# Patient Record
Sex: FEMALE | Race: WHITE | Employment: PART TIME | URBAN - METROPOLITAN AREA
[De-identification: names, ages, dates, MRNs, and addresses within clinical notes are randomized per-mention and may not be internally consistent; named-entity substitution may affect disease eponyms.]

---

## 2017-01-04 ENCOUNTER — HOSPITAL ENCOUNTER (OUTPATIENT)
Dept: CT IMAGING | Age: 62
Discharge: HOME | End: 2017-01-04
Attending: NURSE PRACTITIONER | Admitting: NURSE PRACTITIONER

## 2017-01-04 DIAGNOSIS — C34.12 MALIGNANT NEOPLASM OF UPPER LOBE, LEFT BRONCHUS OR LUNG (HCC): ICD-10-CM

## 2017-01-04 DIAGNOSIS — C34.12 PRIMARY CANCER OF LEFT UPPER LOBE OF LUNG (HCC): ICD-10-CM

## 2017-01-31 RX ORDER — SIMVASTATIN 40 MG
TABLET ORAL
Qty: 30 TABLET | Refills: 0 | Status: SHIPPED | OUTPATIENT
Start: 2017-01-31 | End: 2017-02-28 | Stop reason: SDUPTHER

## 2017-02-28 RX ORDER — SIMVASTATIN 40 MG
TABLET ORAL
Qty: 30 TABLET | Refills: 0 | Status: SHIPPED | OUTPATIENT
Start: 2017-02-28 | End: 2017-03-06 | Stop reason: SDUPTHER

## 2017-03-06 ENCOUNTER — OFFICE VISIT (OUTPATIENT)
Dept: INTERNAL MEDICINE | Age: 62
End: 2017-03-06

## 2017-03-06 VITALS
OXYGEN SATURATION: 100 % | HEART RATE: 70 BPM | HEIGHT: 61 IN | DIASTOLIC BLOOD PRESSURE: 74 MMHG | SYSTOLIC BLOOD PRESSURE: 120 MMHG | WEIGHT: 147.4 LBS | BODY MASS INDEX: 27.83 KG/M2

## 2017-03-06 DIAGNOSIS — Z12.31 SCREENING MAMMOGRAM, ENCOUNTER FOR: ICD-10-CM

## 2017-03-06 DIAGNOSIS — Z13.1 ENCOUNTER FOR SCREENING FOR DIABETES MELLITUS: ICD-10-CM

## 2017-03-06 DIAGNOSIS — Z00.00 ROUTINE GENERAL MEDICAL EXAMINATION AT A HEALTH CARE FACILITY: ICD-10-CM

## 2017-03-06 DIAGNOSIS — C34.12 PRIMARY CANCER OF LEFT UPPER LOBE OF LUNG (HCC): ICD-10-CM

## 2017-03-06 DIAGNOSIS — Z13.820 SCREENING FOR OSTEOPOROSIS: ICD-10-CM

## 2017-03-06 DIAGNOSIS — Z11.59 NEED FOR HEPATITIS C SCREENING TEST: ICD-10-CM

## 2017-03-06 DIAGNOSIS — Z11.4 SCREENING FOR HIV WITHOUT PRESENCE OF RISK FACTORS: ICD-10-CM

## 2017-03-06 DIAGNOSIS — E78.5 HYPERLIPIDEMIA, UNSPECIFIED HYPERLIPIDEMIA TYPE: ICD-10-CM

## 2017-03-06 DIAGNOSIS — I10 ESSENTIAL HYPERTENSION: Primary | ICD-10-CM

## 2017-03-06 PROCEDURE — 99396 PREV VISIT EST AGE 40-64: CPT | Performed by: INTERNAL MEDICINE

## 2017-03-06 PROCEDURE — 93000 ELECTROCARDIOGRAM COMPLETE: CPT | Performed by: INTERNAL MEDICINE

## 2017-03-06 RX ORDER — SIMVASTATIN 40 MG
TABLET ORAL
Qty: 90 TABLET | Refills: 1 | Status: SHIPPED | OUTPATIENT
Start: 2017-03-06 | End: 2017-09-21 | Stop reason: SDUPTHER

## 2017-03-06 ASSESSMENT — ENCOUNTER SYMPTOMS
COUGH: 1
SORE THROAT: 0
BLOOD IN STOOL: 0
RECTAL PAIN: 0
ABDOMINAL DISTENTION: 0
SHORTNESS OF BREATH: 0
EYE DISCHARGE: 0
SINUS PRESSURE: 0
STRIDOR: 0
TROUBLE SWALLOWING: 0
NAUSEA: 0
CHEST TIGHTNESS: 0
ANAL BLEEDING: 0
EYE PAIN: 0
WHEEZING: 0
EYE REDNESS: 0
VOICE CHANGE: 0
COLOR CHANGE: 0
APNEA: 0
RHINORRHEA: 0
PHOTOPHOBIA: 0
DIARRHEA: 1
FACIAL SWELLING: 0
CHOKING: 0
CONSTIPATION: 0
BACK PAIN: 0
ABDOMINAL PAIN: 0
EYE ITCHING: 0
VOMITING: 0

## 2017-03-09 ENCOUNTER — HOSPITAL ENCOUNTER (OUTPATIENT)
Dept: GENERAL RADIOLOGY | Age: 62
Discharge: HOME | End: 2017-03-09
Attending: INTERNAL MEDICINE | Admitting: INTERNAL MEDICINE

## 2017-03-09 DIAGNOSIS — Z13.820 ENCOUNTER FOR SCREENING FOR OSTEOPOROSIS: ICD-10-CM

## 2017-03-09 DIAGNOSIS — Z13.820 SCREENING FOR OSTEOPOROSIS: ICD-10-CM

## 2017-03-16 ENCOUNTER — HOSPITAL ENCOUNTER (OUTPATIENT)
Dept: MAMMOGRAPHY | Age: 62
Discharge: HOME | End: 2017-03-16
Attending: OBSTETRICS & GYNECOLOGY | Admitting: OBSTETRICS & GYNECOLOGY

## 2017-03-16 DIAGNOSIS — Z12.31 SCREENING MAMMOGRAM, ENCOUNTER FOR: ICD-10-CM

## 2017-04-25 ENCOUNTER — HOSPITAL ENCOUNTER (OUTPATIENT)
Dept: CT IMAGING | Age: 62
Discharge: HOME | End: 2017-04-25
Attending: INTERNAL MEDICINE | Admitting: INTERNAL MEDICINE

## 2017-04-25 DIAGNOSIS — C78.7 SECONDARY LIVER CANCER (HCC): ICD-10-CM

## 2017-04-25 DIAGNOSIS — C34.12 PRIMARY CANCER OF LEFT UPPER LOBE OF LUNG (HCC): ICD-10-CM

## 2017-04-25 DIAGNOSIS — C34.12 MALIGNANT NEOPLASM OF UPPER LOBE, LEFT BRONCHUS OR LUNG (HCC): ICD-10-CM

## 2017-04-26 ENCOUNTER — TELEPHONE (OUTPATIENT)
Dept: INTERNAL MEDICINE | Age: 62
End: 2017-04-26

## 2017-04-26 ENCOUNTER — OFFICE VISIT (OUTPATIENT)
Dept: INTERNAL MEDICINE CLINIC | Age: 62
End: 2017-04-26

## 2017-04-26 ENCOUNTER — HOSPITAL ENCOUNTER (OUTPATIENT)
Dept: VASCULAR LAB | Age: 62
Discharge: HOME | End: 2017-04-26
Attending: INTERNAL MEDICINE | Admitting: INTERNAL MEDICINE

## 2017-04-26 VITALS
SYSTOLIC BLOOD PRESSURE: 120 MMHG | HEART RATE: 90 BPM | TEMPERATURE: 98.9 F | OXYGEN SATURATION: 98 % | WEIGHT: 144 LBS | DIASTOLIC BLOOD PRESSURE: 70 MMHG | BODY MASS INDEX: 27.19 KG/M2

## 2017-04-26 DIAGNOSIS — M79.661 PAIN IN RIGHT LOWER LEG: ICD-10-CM

## 2017-04-26 DIAGNOSIS — M79.89 RIGHT LEG SWELLING: Primary | ICD-10-CM

## 2017-04-26 DIAGNOSIS — I82.431 ACUTE DEEP VEIN THROMBOSIS (DVT) OF POPLITEAL VEIN OF RIGHT LOWER EXTREMITY (HCC): Primary | ICD-10-CM

## 2017-04-26 PROCEDURE — 99213 OFFICE O/P EST LOW 20 MIN: CPT | Performed by: NURSE PRACTITIONER

## 2017-04-26 ASSESSMENT — ENCOUNTER SYMPTOMS
SHORTNESS OF BREATH: 0
COUGH: 0
WHEEZING: 0
CHEST TIGHTNESS: 0

## 2017-05-11 ENCOUNTER — OFFICE VISIT (OUTPATIENT)
Dept: INTERNAL MEDICINE | Age: 62
End: 2017-05-11

## 2017-05-11 VITALS
WEIGHT: 142 LBS | OXYGEN SATURATION: 97 % | DIASTOLIC BLOOD PRESSURE: 64 MMHG | BODY MASS INDEX: 26.81 KG/M2 | HEART RATE: 76 BPM | SYSTOLIC BLOOD PRESSURE: 106 MMHG

## 2017-05-11 DIAGNOSIS — I82.4Y1 ACUTE DEEP VEIN THROMBOSIS (DVT) OF PROXIMAL VEIN OF RIGHT LOWER EXTREMITY (HCC): ICD-10-CM

## 2017-05-11 PROBLEM — I82.409 DVT (DEEP VENOUS THROMBOSIS) (HCC): Status: ACTIVE | Noted: 2017-05-11

## 2017-05-11 PROCEDURE — 99213 OFFICE O/P EST LOW 20 MIN: CPT | Performed by: INTERNAL MEDICINE

## 2017-05-11 ASSESSMENT — ENCOUNTER SYMPTOMS
CHEST TIGHTNESS: 0
RESPIRATORY NEGATIVE: 1
SHORTNESS OF BREATH: 0
GASTROINTESTINAL NEGATIVE: 1
WHEEZING: 0

## 2017-07-14 ENCOUNTER — HOSPITAL ENCOUNTER (OUTPATIENT)
Dept: CT IMAGING | Age: 62
Discharge: HOME | End: 2017-07-14
Attending: INTERNAL MEDICINE | Admitting: INTERNAL MEDICINE

## 2017-07-14 DIAGNOSIS — C34.12 PRIMARY CANCER OF LEFT UPPER LOBE OF LUNG (HCC): ICD-10-CM

## 2017-07-14 DIAGNOSIS — C78.7 SECONDARY LIVER CANCER (HCC): ICD-10-CM

## 2017-07-14 DIAGNOSIS — C34.12 MALIGNANT NEOPLASM OF UPPER LOBE, LEFT BRONCHUS OR LUNG (HCC): ICD-10-CM

## 2017-09-22 RX ORDER — SIMVASTATIN 40 MG
TABLET ORAL
Qty: 90 TABLET | Refills: 1 | Status: SHIPPED | OUTPATIENT
Start: 2017-09-22 | End: 2018-03-14 | Stop reason: SDUPTHER

## 2017-11-13 RX ORDER — RIVAROXABAN 20 MG/1
TABLET, FILM COATED ORAL
Qty: 90 TABLET | Refills: 1 | Status: SHIPPED | OUTPATIENT
Start: 2017-11-13 | End: 2019-10-04 | Stop reason: CLARIF

## 2017-11-30 ENCOUNTER — HOSPITAL ENCOUNTER (OUTPATIENT)
Dept: CT IMAGING | Age: 62
Discharge: HOME | End: 2017-11-30
Attending: INTERNAL MEDICINE | Admitting: INTERNAL MEDICINE

## 2017-11-30 DIAGNOSIS — C34.12 MALIGNANT NEOPLASM OF UPPER LOBE, LEFT BRONCHUS OR LUNG (HCC): ICD-10-CM

## 2017-11-30 DIAGNOSIS — C34.12 CANCER OF BRONCHUS OF LEFT UPPER LOBE (HCC): ICD-10-CM

## 2018-03-14 RX ORDER — SIMVASTATIN 40 MG
TABLET ORAL
Qty: 90 TABLET | Refills: 1 | Status: SHIPPED | OUTPATIENT
Start: 2018-03-14 | End: 2018-09-11 | Stop reason: SDUPTHER

## 2018-05-04 ENCOUNTER — HOSPITAL ENCOUNTER (OUTPATIENT)
Dept: MAMMOGRAPHY | Age: 63
Discharge: HOME | End: 2018-05-04
Attending: INTERNAL MEDICINE | Admitting: INTERNAL MEDICINE

## 2018-05-04 DIAGNOSIS — Z12.31 VISIT FOR SCREENING MAMMOGRAM: ICD-10-CM

## 2018-05-09 RX ORDER — RIVAROXABAN 20 MG/1
TABLET, FILM COATED ORAL
Qty: 90 TABLET | Refills: 1 | OUTPATIENT
Start: 2018-05-09

## 2018-06-15 ENCOUNTER — HOSPITAL ENCOUNTER (OUTPATIENT)
Dept: CT IMAGING | Age: 63
Discharge: HOME | End: 2018-06-15
Attending: INTERNAL MEDICINE | Admitting: INTERNAL MEDICINE

## 2018-06-15 DIAGNOSIS — C34.2 MALIGNANT NEOPLASM OF MIDDLE LOBE, BRONCHUS OR LUNG (HCC): ICD-10-CM

## 2018-06-15 DIAGNOSIS — C34.12 CANCER OF BRONCHUS OF LEFT UPPER LOBE (HCC): ICD-10-CM

## 2018-07-17 ENCOUNTER — HOSPITAL ENCOUNTER (OUTPATIENT)
Dept: NON INVASIVE DIAGNOSTICS | Age: 63
Discharge: HOME | End: 2018-07-17
Payer: COMMERCIAL

## 2018-07-17 LAB
LV EF: 63 %
LVEF MODALITY: NORMAL

## 2018-07-17 PROCEDURE — 93306 TTE W/DOPPLER COMPLETE: CPT

## 2018-09-11 RX ORDER — SIMVASTATIN 40 MG
TABLET ORAL
Qty: 90 TABLET | Refills: 1 | Status: SHIPPED | OUTPATIENT
Start: 2018-09-11 | End: 2019-03-05 | Stop reason: SDUPTHER

## 2018-10-31 ENCOUNTER — HOSPITAL ENCOUNTER (OUTPATIENT)
Dept: CT IMAGING | Age: 63
Discharge: HOME | End: 2018-10-31
Payer: COMMERCIAL

## 2018-10-31 DIAGNOSIS — C34.12 CANCER OF BRONCHUS OF LEFT UPPER LOBE (HCC): ICD-10-CM

## 2018-10-31 PROCEDURE — 71260 CT THORAX DX C+: CPT

## 2018-10-31 PROCEDURE — 74177 CT ABD & PELVIS W/CONTRAST: CPT

## 2018-10-31 PROCEDURE — 6360000004 HC RX CONTRAST MEDICATION: Performed by: INTERNAL MEDICINE

## 2018-10-31 RX ADMIN — IOPAMIDOL 80 ML: 755 INJECTION, SOLUTION INTRAVENOUS at 09:53

## 2018-10-31 RX ADMIN — IOHEXOL 50 ML: 240 INJECTION, SOLUTION INTRATHECAL; INTRAVASCULAR; INTRAVENOUS; ORAL at 09:53

## 2018-11-14 ENCOUNTER — HOSPITAL ENCOUNTER (OUTPATIENT)
Dept: CT IMAGING | Age: 63
Discharge: HOME | End: 2018-11-14
Payer: COMMERCIAL

## 2018-11-14 DIAGNOSIS — C34.12 CANCER OF BRONCHUS OF LEFT UPPER LOBE (HCC): ICD-10-CM

## 2018-11-14 PROCEDURE — 71250 CT THORAX DX C-: CPT

## 2018-11-29 ENCOUNTER — OFFICE VISIT (OUTPATIENT)
Dept: PULMONOLOGY | Age: 63
End: 2018-11-29
Payer: COMMERCIAL

## 2018-11-29 VITALS
SYSTOLIC BLOOD PRESSURE: 136 MMHG | WEIGHT: 159.8 LBS | DIASTOLIC BLOOD PRESSURE: 74 MMHG | RESPIRATION RATE: 18 BRPM | OXYGEN SATURATION: 99 % | HEIGHT: 61 IN | BODY MASS INDEX: 30.17 KG/M2 | HEART RATE: 86 BPM

## 2018-11-29 DIAGNOSIS — R59.0 MEDIASTINAL LYMPHADENOPATHY: ICD-10-CM

## 2018-11-29 DIAGNOSIS — J70.0 RADIATION PNEUMONITIS (HCC): Primary | ICD-10-CM

## 2018-11-29 DIAGNOSIS — C34.12 PRIMARY CANCER OF LEFT UPPER LOBE OF LUNG (HCC): ICD-10-CM

## 2018-11-29 PROCEDURE — 1036F TOBACCO NON-USER: CPT | Performed by: INTERNAL MEDICINE

## 2018-11-29 PROCEDURE — 99214 OFFICE O/P EST MOD 30 MIN: CPT | Performed by: INTERNAL MEDICINE

## 2018-11-29 PROCEDURE — G8484 FLU IMMUNIZE NO ADMIN: HCPCS | Performed by: INTERNAL MEDICINE

## 2018-11-29 PROCEDURE — 3017F COLORECTAL CA SCREEN DOC REV: CPT | Performed by: INTERNAL MEDICINE

## 2018-11-29 PROCEDURE — G8417 CALC BMI ABV UP PARAM F/U: HCPCS | Performed by: INTERNAL MEDICINE

## 2018-11-29 PROCEDURE — G8427 DOCREV CUR MEDS BY ELIG CLIN: HCPCS | Performed by: INTERNAL MEDICINE

## 2018-11-29 RX ORDER — PREDNISONE 10 MG/1
TABLET ORAL
Qty: 100 TABLET | Refills: 0 | Status: SHIPPED | OUTPATIENT
Start: 2018-11-29 | End: 2018-12-20 | Stop reason: SDUPTHER

## 2018-11-29 RX ORDER — LISINOPRIL 10 MG/1
10 TABLET ORAL DAILY
COMMUNITY

## 2018-11-29 NOTE — PROGRESS NOTES
Subjective:      Patient ID: Sabina Jones is a 66 y.o. female. HPI  Mrs. Deana Maria is referred by Dr. Lior Hernandez for evaluation of progressive pulmonary infiltrates. She has a history of adenocarcinoma the lung that was diagnosed 2 years ago, with the extensive mediastinal lymphadenopathy. She has been treated with chemotherapy, and in July of this year treated with radiation therapy to the left chest.  The central tumor and lymphadenopathy have been regressing on successive scans. She generally has been feeling well, does not complain of shortness of breath, except perhaps getting a little winded going up stairs. However she has not curtailed any activities. She had a cough in the summer that was productive of mucopurulent sputum. This resolved and was replaced with a nonproductive cough. She has not had any febrile illness, night sweats or chills. Denies any chest pain or tightness. She has had left vocal cord paralysis, related to the cancer and had a medialization procedure done to stabilize the left vocal cord. She has been undergoing voice therapy since then and is recovering a more normal voice. Chest CT scan in July showed enlarging left hilar mass and unchanged anterior mediastinal opacity. A follow-up CT scan on 10/31/18, post radiation therapy, showed decrease in size of both anterior mediastinal opacities and left hilar masses. On the new scan, however there was development of alveolar opacities with a peribronchial distribution, centrally. A follow-up scan on 11/14/18 showed worsening alveolar infiltrates, with the same distribution. She is a lifelong nonsmoker. She has a history of allergies particularly to cats and feathers. She does not have seasonal allergies. She has no history of asthma associated with this. She has a history of melanoma occurring on her upper back, status post excision in 2013.     Review of Systems    Objective:   Physical Exam   Constitutional:

## 2018-12-03 ENCOUNTER — TELEPHONE (OUTPATIENT)
Dept: PULMONOLOGY | Age: 63
End: 2018-12-03

## 2018-12-19 ENCOUNTER — HOSPITAL ENCOUNTER (OUTPATIENT)
Dept: CT IMAGING | Age: 63
Discharge: HOME | End: 2018-12-19
Payer: COMMERCIAL

## 2018-12-19 DIAGNOSIS — J70.0 RADIATION PNEUMONITIS (HCC): ICD-10-CM

## 2018-12-19 PROCEDURE — 71250 CT THORAX DX C-: CPT

## 2018-12-20 ENCOUNTER — OFFICE VISIT (OUTPATIENT)
Dept: PULMONOLOGY | Age: 63
End: 2018-12-20
Payer: COMMERCIAL

## 2018-12-20 VITALS
RESPIRATION RATE: 18 BRPM | HEIGHT: 61 IN | WEIGHT: 161.8 LBS | DIASTOLIC BLOOD PRESSURE: 80 MMHG | BODY MASS INDEX: 30.55 KG/M2 | SYSTOLIC BLOOD PRESSURE: 136 MMHG | HEART RATE: 96 BPM | OXYGEN SATURATION: 98 %

## 2018-12-20 DIAGNOSIS — J70.0 RADIATION PNEUMONITIS (HCC): Primary | ICD-10-CM

## 2018-12-20 PROCEDURE — 1036F TOBACCO NON-USER: CPT | Performed by: INTERNAL MEDICINE

## 2018-12-20 PROCEDURE — 99213 OFFICE O/P EST LOW 20 MIN: CPT | Performed by: INTERNAL MEDICINE

## 2018-12-20 PROCEDURE — 3017F COLORECTAL CA SCREEN DOC REV: CPT | Performed by: INTERNAL MEDICINE

## 2018-12-20 PROCEDURE — G8417 CALC BMI ABV UP PARAM F/U: HCPCS | Performed by: INTERNAL MEDICINE

## 2018-12-20 PROCEDURE — G8484 FLU IMMUNIZE NO ADMIN: HCPCS | Performed by: INTERNAL MEDICINE

## 2018-12-20 PROCEDURE — G8427 DOCREV CUR MEDS BY ELIG CLIN: HCPCS | Performed by: INTERNAL MEDICINE

## 2018-12-20 RX ORDER — PREDNISONE 10 MG/1
TABLET ORAL
Qty: 100 TABLET | Refills: 1 | Status: SHIPPED | OUTPATIENT
Start: 2018-12-20 | End: 2018-12-26 | Stop reason: SDUPTHER

## 2018-12-20 NOTE — PROGRESS NOTES
Subjective:      Patient ID: Td Aiken is a 66 y.o. female. HPI  Mrs. Robert Miranda returns after 3 weeks of prednisone 30 mg daily for treatment of radiation pneumonitis involving the left lung. She reports that her cough has become or productive of viscous mucoid sputum, but she is not necessarily coughing more frequently. She does not have shortness of breath, although limits her activity somewhat. No febrile illness. No GI complaints. Appetite is good. Chest CT scan on 12/19/18 is reviewed, compared to the chest CT from 11/14/18. The perihilar alveolar opacities have decreased somewhat compared to November study. The overall process continues, but there is less density. .  This is in contrast to the radiologist interpretation of no significant change from prior exam..  There are no new findings. Review of Systems    Objective:   Physical Exam   Constitutional: She is oriented to person, place, and time. She appears well-developed and well-nourished. HENT:   Head: Normocephalic and atraumatic. Mouth/Throat: Oropharynx is clear and moist. No oropharyngeal exudate. Eyes: Right eye exhibits no discharge. Left eye exhibits no discharge. No scleral icterus. Neck: No tracheal deviation present. No thyromegaly present. Cardiovascular: Normal rate, regular rhythm, normal heart sounds and intact distal pulses. No murmur heard. Pulmonary/Chest: Effort normal and breath sounds normal. No respiratory distress. She has no wheezes. She has no rales. Musculoskeletal: She exhibits no edema. Lymphadenopathy:     She has no cervical adenopathy. Neurological: She is alert and oriented to person, place, and time. Skin: Skin is warm and dry. Psychiatric: She has a normal mood and affect. Her behavior is normal. Judgment and thought content normal.   Vitals reviewed. Assessment:      Radiation pneumonitis, involving the left lung.   Clinically after 3 weeks she has increased secretion

## 2018-12-26 RX ORDER — PREDNISONE 10 MG/1
TABLET ORAL
Qty: 100 TABLET | Refills: 0 | Status: ON HOLD | OUTPATIENT
Start: 2018-12-26 | End: 2019-03-15 | Stop reason: ALTCHOICE

## 2018-12-26 NOTE — TELEPHONE ENCOUNTER
Dr. Gale Granados PT. Refill request for pending medication- prednisone. I copied Dr. Gale Granados last note regarding prednisone. See below, OV notes 12/20/18. Plan:   Continue prednisone 30 mg daily for another 3 weeks, reevaluate clinically and consider tapering dose at that point. Plan follow-up chest CT at the end of prednisone therapy. Advised a habit of regular exercise to improve overall fitness and health.

## 2019-01-14 ENCOUNTER — OFFICE VISIT (OUTPATIENT)
Dept: PULMONOLOGY | Age: 64
End: 2019-01-14
Payer: COMMERCIAL

## 2019-01-14 VITALS
HEART RATE: 100 BPM | HEIGHT: 61 IN | RESPIRATION RATE: 18 BRPM | BODY MASS INDEX: 31.53 KG/M2 | SYSTOLIC BLOOD PRESSURE: 132 MMHG | WEIGHT: 167 LBS | OXYGEN SATURATION: 99 % | DIASTOLIC BLOOD PRESSURE: 80 MMHG

## 2019-01-14 DIAGNOSIS — C34.12 PRIMARY CANCER OF LEFT UPPER LOBE OF LUNG (HCC): ICD-10-CM

## 2019-01-14 DIAGNOSIS — J70.0 RADIATION PNEUMONITIS (HCC): Primary | ICD-10-CM

## 2019-01-14 PROCEDURE — G8417 CALC BMI ABV UP PARAM F/U: HCPCS | Performed by: INTERNAL MEDICINE

## 2019-01-14 PROCEDURE — 94618 PULMONARY STRESS TESTING: CPT | Performed by: INTERNAL MEDICINE

## 2019-01-14 PROCEDURE — 1036F TOBACCO NON-USER: CPT | Performed by: INTERNAL MEDICINE

## 2019-01-14 PROCEDURE — 3017F COLORECTAL CA SCREEN DOC REV: CPT | Performed by: INTERNAL MEDICINE

## 2019-01-14 PROCEDURE — G8484 FLU IMMUNIZE NO ADMIN: HCPCS | Performed by: INTERNAL MEDICINE

## 2019-01-14 PROCEDURE — 99214 OFFICE O/P EST MOD 30 MIN: CPT | Performed by: INTERNAL MEDICINE

## 2019-01-14 PROCEDURE — G8427 DOCREV CUR MEDS BY ELIG CLIN: HCPCS | Performed by: INTERNAL MEDICINE

## 2019-01-21 ENCOUNTER — TELEPHONE (OUTPATIENT)
Dept: PULMONOLOGY | Age: 64
End: 2019-01-21

## 2019-01-21 DIAGNOSIS — J70.0 RADIATION PNEUMONITIS (HCC): Primary | ICD-10-CM

## 2019-01-28 ENCOUNTER — HOSPITAL ENCOUNTER (OUTPATIENT)
Dept: GENERAL RADIOLOGY | Age: 64
Discharge: HOME | End: 2019-01-28
Payer: COMMERCIAL

## 2019-01-28 ENCOUNTER — HOSPITAL ENCOUNTER (OUTPATIENT)
Age: 64
Discharge: HOME | End: 2019-01-28
Payer: COMMERCIAL

## 2019-01-28 DIAGNOSIS — J70.0: ICD-10-CM

## 2019-01-28 PROCEDURE — 71046 X-RAY EXAM CHEST 2 VIEWS: CPT

## 2019-01-31 ENCOUNTER — TELEPHONE (OUTPATIENT)
Dept: PULMONOLOGY | Age: 64
End: 2019-01-31

## 2019-02-14 ENCOUNTER — OFFICE VISIT (OUTPATIENT)
Dept: PULMONOLOGY | Age: 64
End: 2019-02-14
Payer: COMMERCIAL

## 2019-02-14 VITALS
RESPIRATION RATE: 18 BRPM | SYSTOLIC BLOOD PRESSURE: 130 MMHG | BODY MASS INDEX: 31.15 KG/M2 | WEIGHT: 165 LBS | OXYGEN SATURATION: 98 % | HEART RATE: 109 BPM | DIASTOLIC BLOOD PRESSURE: 72 MMHG | HEIGHT: 61 IN

## 2019-02-14 DIAGNOSIS — R05.9 COUGH: ICD-10-CM

## 2019-02-14 DIAGNOSIS — J70.0 RADIATION PNEUMONITIS (HCC): Primary | ICD-10-CM

## 2019-02-14 DIAGNOSIS — C34.12 PRIMARY CANCER OF LEFT UPPER LOBE OF LUNG (HCC): ICD-10-CM

## 2019-02-14 PROCEDURE — G8427 DOCREV CUR MEDS BY ELIG CLIN: HCPCS | Performed by: INTERNAL MEDICINE

## 2019-02-14 PROCEDURE — G8417 CALC BMI ABV UP PARAM F/U: HCPCS | Performed by: INTERNAL MEDICINE

## 2019-02-14 PROCEDURE — G8484 FLU IMMUNIZE NO ADMIN: HCPCS | Performed by: INTERNAL MEDICINE

## 2019-02-14 PROCEDURE — 1036F TOBACCO NON-USER: CPT | Performed by: INTERNAL MEDICINE

## 2019-02-14 PROCEDURE — 99213 OFFICE O/P EST LOW 20 MIN: CPT | Performed by: INTERNAL MEDICINE

## 2019-02-14 PROCEDURE — 3017F COLORECTAL CA SCREEN DOC REV: CPT | Performed by: INTERNAL MEDICINE

## 2019-03-04 ENCOUNTER — HOSPITAL ENCOUNTER (OUTPATIENT)
Dept: CT IMAGING | Age: 64
Discharge: HOME | End: 2019-03-04
Payer: COMMERCIAL

## 2019-03-04 DIAGNOSIS — C34.12 CANCER OF BRONCHUS OF LEFT UPPER LOBE (HCC): ICD-10-CM

## 2019-03-04 LAB
GFR AFRICAN AMERICAN: >60
GFR NON-AFRICAN AMERICAN: >60
PERFORMED ON: NORMAL
POC CREATININE: 0.7 MG/DL (ref 0.6–1.2)
POC SAMPLE TYPE: NORMAL

## 2019-03-04 PROCEDURE — 6360000004 HC RX CONTRAST MEDICATION: Performed by: INTERNAL MEDICINE

## 2019-03-04 PROCEDURE — 70491 CT SOFT TISSUE NECK W/DYE: CPT

## 2019-03-04 PROCEDURE — 82565 ASSAY OF CREATININE: CPT

## 2019-03-04 PROCEDURE — 71260 CT THORAX DX C+: CPT

## 2019-03-04 RX ADMIN — IOHEXOL 50 ML: 240 INJECTION, SOLUTION INTRATHECAL; INTRAVASCULAR; INTRAVENOUS; ORAL at 11:05

## 2019-03-04 RX ADMIN — IOPAMIDOL 100 ML: 755 INJECTION, SOLUTION INTRAVENOUS at 11:04

## 2019-03-05 RX ORDER — SIMVASTATIN 40 MG
TABLET ORAL
Qty: 90 TABLET | Refills: 0 | Status: SHIPPED | OUTPATIENT
Start: 2019-03-05

## 2019-03-15 ENCOUNTER — HOSPITAL ENCOUNTER (OUTPATIENT)
Dept: INTERVENTIONAL RADIOLOGY/VASCULAR | Age: 64
Discharge: HOME | End: 2019-03-15
Attending: RADIOLOGY | Admitting: RADIOLOGY
Payer: COMMERCIAL

## 2019-03-15 VITALS — BODY MASS INDEX: 29.44 KG/M2 | WEIGHT: 160 LBS | TEMPERATURE: 98.4 F | HEIGHT: 62 IN

## 2019-03-15 PROCEDURE — 6360000004 HC RX CONTRAST MEDICATION: Performed by: RADIOLOGY

## 2019-03-15 PROCEDURE — C1725 CATH, TRANSLUMIN NON-LASER: HCPCS

## 2019-03-15 PROCEDURE — 36010 PLACE CATHETER IN VEIN: CPT

## 2019-03-15 PROCEDURE — 75827 VEIN X-RAY CHEST: CPT

## 2019-03-15 PROCEDURE — 99152 MOD SED SAME PHYS/QHP 5/>YRS: CPT

## 2019-03-15 PROCEDURE — C1769 GUIDE WIRE: HCPCS

## 2019-03-15 PROCEDURE — C1887 CATHETER, GUIDING: HCPCS

## 2019-03-15 PROCEDURE — 2709999900 HC NON-CHARGEABLE SUPPLY

## 2019-03-15 PROCEDURE — C1894 INTRO/SHEATH, NON-LASER: HCPCS

## 2019-03-15 PROCEDURE — 99153 MOD SED SAME PHYS/QHP EA: CPT

## 2019-03-15 PROCEDURE — 37248 TRLUML BALO ANGIOP 1ST VEIN: CPT

## 2019-03-15 PROCEDURE — 2500000003 HC RX 250 WO HCPCS

## 2019-03-15 PROCEDURE — 6360000002 HC RX W HCPCS

## 2019-03-15 RX ADMIN — IOPAMIDOL 100 ML: 755 INJECTION, SOLUTION INTRAVENOUS at 16:28

## 2019-03-16 PROBLEM — R05.9 COUGH: Status: RESOLVED | Noted: 2019-02-14 | Resolved: 2019-03-16

## 2019-03-29 ENCOUNTER — PRE-PROCEDURE TELEPHONE (OUTPATIENT)
Dept: CARDIAC CATH/INVASIVE PROCEDURES | Age: 64
End: 2019-03-29

## 2019-04-01 ENCOUNTER — HOSPITAL ENCOUNTER (OUTPATIENT)
Dept: INTERVENTIONAL RADIOLOGY/VASCULAR | Age: 64
Discharge: HOME | End: 2019-04-01
Attending: RADIOLOGY | Admitting: RADIOLOGY
Payer: COMMERCIAL

## 2019-04-01 VITALS — BODY MASS INDEX: 27.6 KG/M2 | TEMPERATURE: 98.8 F | HEIGHT: 62 IN | WEIGHT: 150 LBS

## 2019-04-01 LAB
ANION GAP SERPL CALCULATED.3IONS-SCNC: 17 MMOL/L (ref 3–16)
BUN BLDV-MCNC: 12 MG/DL (ref 7–20)
CALCIUM SERPL-MCNC: 10.1 MG/DL (ref 8.3–10.6)
CHLORIDE BLD-SCNC: 97 MMOL/L (ref 99–110)
CO2: 25 MMOL/L (ref 21–32)
CREAT SERPL-MCNC: 0.7 MG/DL (ref 0.6–1.2)
GFR AFRICAN AMERICAN: >60
GFR NON-AFRICAN AMERICAN: >60
GLUCOSE BLD-MCNC: 156 MG/DL (ref 70–99)
HCT VFR BLD CALC: 33.2 % (ref 36–48)
HEMOGLOBIN: 10.6 G/DL (ref 12–16)
INR BLD: 1.11 (ref 0.86–1.14)
MCH RBC QN AUTO: 28.1 PG (ref 26–34)
MCHC RBC AUTO-ENTMCNC: 32 G/DL (ref 31–36)
MCV RBC AUTO: 87.8 FL (ref 80–100)
PDW BLD-RTO: 16 % (ref 12.4–15.4)
PLATELET # BLD: 325 K/UL (ref 135–450)
PMV BLD AUTO: 7.2 FL (ref 5–10.5)
POTASSIUM SERPL-SCNC: 3.8 MMOL/L (ref 3.5–5.1)
PROTHROMBIN TIME: 12.6 SEC (ref 9.8–13)
RBC # BLD: 3.78 M/UL (ref 4–5.2)
SODIUM BLD-SCNC: 139 MMOL/L (ref 136–145)
WBC # BLD: 9 K/UL (ref 4–11)

## 2019-04-01 PROCEDURE — 36010 PLACE CATHETER IN VEIN: CPT

## 2019-04-01 PROCEDURE — 80048 BASIC METABOLIC PNL TOTAL CA: CPT

## 2019-04-01 PROCEDURE — C1894 INTRO/SHEATH, NON-LASER: HCPCS

## 2019-04-01 PROCEDURE — 2709999900 HC NON-CHARGEABLE SUPPLY

## 2019-04-01 PROCEDURE — 6360000004 HC RX CONTRAST MEDICATION: Performed by: RADIOLOGY

## 2019-04-01 PROCEDURE — 37248 TRLUML BALO ANGIOP 1ST VEIN: CPT

## 2019-04-01 PROCEDURE — C1725 CATH, TRANSLUMIN NON-LASER: HCPCS

## 2019-04-01 PROCEDURE — 99153 MOD SED SAME PHYS/QHP EA: CPT

## 2019-04-01 PROCEDURE — 2500000003 HC RX 250 WO HCPCS

## 2019-04-01 PROCEDURE — C1769 GUIDE WIRE: HCPCS

## 2019-04-01 PROCEDURE — 99152 MOD SED SAME PHYS/QHP 5/>YRS: CPT

## 2019-04-01 PROCEDURE — 85610 PROTHROMBIN TIME: CPT

## 2019-04-01 PROCEDURE — 75827 VEIN X-RAY CHEST: CPT

## 2019-04-01 PROCEDURE — 6360000002 HC RX W HCPCS

## 2019-04-01 PROCEDURE — 6370000000 HC RX 637 (ALT 250 FOR IP): Performed by: RADIOLOGY

## 2019-04-01 PROCEDURE — 85027 COMPLETE CBC AUTOMATED: CPT

## 2019-04-01 RX ORDER — ACETAMINOPHEN 325 MG/1
650 TABLET ORAL EVERY 4 HOURS PRN
Status: DISCONTINUED | OUTPATIENT
Start: 2019-04-01 | End: 2019-04-02 | Stop reason: HOSPADM

## 2019-04-01 RX ORDER — IODIXANOL 320 MG/ML
100 INJECTION, SOLUTION INTRAVASCULAR
Status: COMPLETED | OUTPATIENT
Start: 2019-04-01 | End: 2019-04-01

## 2019-04-01 RX ORDER — ONDANSETRON 2 MG/ML
4 INJECTION INTRAMUSCULAR; INTRAVENOUS EVERY 8 HOURS PRN
Status: DISCONTINUED | OUTPATIENT
Start: 2019-04-01 | End: 2019-04-02 | Stop reason: HOSPADM

## 2019-04-01 RX ADMIN — IODIXANOL 100 ML: 320 INJECTION, SOLUTION INTRAVASCULAR at 12:23

## 2019-04-01 RX ADMIN — IOHEXOL 50 ML: 350 INJECTION, SOLUTION INTRAVENOUS at 12:12

## 2019-04-01 RX ADMIN — RIVAROXABAN 20 MG: 20 TABLET, FILM COATED ORAL at 13:52

## 2019-04-01 ASSESSMENT — PAIN - FUNCTIONAL ASSESSMENT: PAIN_FUNCTIONAL_ASSESSMENT: 0-10

## 2019-04-01 NOTE — PLAN OF CARE
pulled more proximal during the procedure. Fluoroscopy time: 5.4 minute  Number of images: 67  Ultrasound images: 1  Consultations: None  Estimated blood loss: Less than 1 cc    IMPRESSION:  1. Balloon dilatation superior vena cava as described. Assessment as to relief of the SVC symptoms 1 the patient awakens in the morning will be reassessed. Because of the size of the upper superior vena cava measuring 20 mm, a stent 22 mm would be required if this remains symptomatic. Prior to placement of the stent, removal of the portacatheter is suggested centimeters of removing individual variables that can be associated with the patient's symptoms.     The patient was restarted on Xarelto 2 hours following the procedure

## 2019-04-01 NOTE — H&P
IR  H & P      Patient:  Juan Antonio Connelly   :   1955      Relevant patient history reviewed and discussed. CC: recurrent SVC symptoms    The procedure including risks and benefits was discussed at length with the patient (or designated family member) and all questions were answered. Informed consent to proceed with the procedure was given. Heart : regular rate and rhythm  Lungs : clear, breathing easily  Airway Assessment: Mallampati 3  Condition : stable    Nicci Scale: Activity:  2 - Able to move 4 extremities voluntarily on command  Respiration:  2 - Able to breathe deeply and cough freely  Circulation:  2 - BP+/- 20mmHg of normal  Consciousness:  2 - Fully awake  Oxygen Saturation (color):  2 - Able to maintain oxygen saturation >92% on room air      [facility] nurses notes reviewed and agreed. Medications reviewed. No current facility-administered medications on file prior to encounter. Current Outpatient Medications on File Prior to Encounter   Medication Sig Dispense Refill    simvastatin (ZOCOR) 40 MG tablet TAKE 1 TABLET BY MOUTH EVERY EVENING 90 tablet 0    Brigatinib (ALUNBRIG) 180 MG TABS Take 180 tablets by mouth      lisinopril (PRINIVIL;ZESTRIL) 10 MG tablet Take 10 mg by mouth daily      Ascorbic Acid (VITAMIN C) 500 MG tablet Take 500 mg by mouth daily      Vitamin D (CHOLECALCIFEROL) 1000 UNITS CAPS capsule Take 1,000 Units by mouth daily.  Multiple Vitamin (MULTIVITAMINS PO) Take 1 tablet by mouth daily.  XARELTO 20 MG TABS tablet TAKE 1 TABLET BY MOUTH DAILY WITH BREAKFAST 90 tablet 1    calcium carbonate (OSCAL) 500 MG TABS tablet Take 500 mg by mouth daily       fish oil-omega-3 fatty acids 1000 MG capsule Take 1 g by mouth daily. Allergies:    Allergies   Allergen Reactions    Penicillins Rash    Other      Cats and  Feathers- cause sneezing and itching eyes     Sedation : Moderate sedation planned  ASA 2 - Patient with mild systemic disease with no functional limitations     cleared

## 2019-04-04 ENCOUNTER — HOSPITAL ENCOUNTER (OUTPATIENT)
Dept: NON INVASIVE DIAGNOSTICS | Age: 64
Discharge: HOME | End: 2019-04-04
Payer: COMMERCIAL

## 2019-04-04 LAB
LV EF: 65 %
LVEF MODALITY: NORMAL

## 2019-04-04 PROCEDURE — 93306 TTE W/DOPPLER COMPLETE: CPT

## 2019-04-08 ENCOUNTER — HOSPITAL ENCOUNTER (OUTPATIENT)
Dept: INTERVENTIONAL RADIOLOGY/VASCULAR | Age: 64
Discharge: HOME | End: 2019-04-08
Attending: RADIOLOGY | Admitting: RADIOLOGY
Payer: COMMERCIAL

## 2019-04-08 VITALS — HEIGHT: 62 IN | WEIGHT: 150 LBS | BODY MASS INDEX: 27.6 KG/M2

## 2019-04-08 PROCEDURE — C1725 CATH, TRANSLUMIN NON-LASER: HCPCS

## 2019-04-08 PROCEDURE — 6360000004 HC RX CONTRAST MEDICATION: Performed by: RADIOLOGY

## 2019-04-08 PROCEDURE — 75822 VEIN X-RAY ARMS/LEGS: CPT | Performed by: RADIOLOGY

## 2019-04-08 PROCEDURE — 6370000000 HC RX 637 (ALT 250 FOR IP): Performed by: RADIOLOGY

## 2019-04-08 PROCEDURE — C1769 GUIDE WIRE: HCPCS

## 2019-04-08 PROCEDURE — C1894 INTRO/SHEATH, NON-LASER: HCPCS

## 2019-04-08 PROCEDURE — 37249 TRLUML BALO ANGIOP ADDL VEIN: CPT | Performed by: RADIOLOGY

## 2019-04-08 PROCEDURE — 37248 TRLUML BALO ANGIOP 1ST VEIN: CPT | Performed by: RADIOLOGY

## 2019-04-08 PROCEDURE — 99153 MOD SED SAME PHYS/QHP EA: CPT | Performed by: RADIOLOGY

## 2019-04-08 PROCEDURE — 6360000002 HC RX W HCPCS

## 2019-04-08 PROCEDURE — 99152 MOD SED SAME PHYS/QHP 5/>YRS: CPT | Performed by: RADIOLOGY

## 2019-04-08 PROCEDURE — 2500000003 HC RX 250 WO HCPCS

## 2019-04-08 PROCEDURE — 36005 INJECTION EXT VENOGRAPHY: CPT | Performed by: RADIOLOGY

## 2019-04-08 PROCEDURE — C1887 CATHETER, GUIDING: HCPCS

## 2019-04-08 RX ORDER — IODIXANOL 320 MG/ML
100 INJECTION, SOLUTION INTRAVASCULAR
Status: COMPLETED | OUTPATIENT
Start: 2019-04-08 | End: 2019-04-08

## 2019-04-08 RX ADMIN — IODIXANOL 100 ML: 320 INJECTION, SOLUTION INTRAVASCULAR at 12:46

## 2019-04-08 RX ADMIN — RIVAROXABAN 20 MG: 20 TABLET, FILM COATED ORAL at 13:06

## 2019-04-08 ASSESSMENT — PAIN SCALES - GENERAL: PAINLEVEL_OUTOF10: 0

## 2019-04-08 NOTE — H&P
Patient:  Hakan Taylor   :   1955      Relevant clinical history, particularly as it involves the pending procedure, was reviewed and discussed. The procedure including risks and benefits was discussed at length with the patient (or designated family member) and all questions were answered. Informed consent to proceed with the procedure was given. Vital signs were monitored and documented by the Radiology nurse. Targeted physical examination  Heart : regular rate and rhythm  Lungs : clear, breathing easily  Condition : stable    [facility] nurses notes reviewed and agreed. Past Medical History:        Diagnosis Date    Allergic     Hyperlipidemia     Lung cancer ([address]     [facility] of back [facility] [address]     Skin cancer     melanoma L shoulder        Past Surgical History:           Procedure Laterality Date    BRONCHOSCOPY  2016    BUNIONECTOMY Right     COLONOSCOPY  09    DILATION AND CURETTAGE OF UTERUS      OTHER SURGICAL HISTORY Left 2016    SINGLE LUMEN LEFT SUBCLAVIAN PORT A CATH PLACEMENT - LEAVE ACCESSED    VOCAL CORD AUGMENTATION W/RADIESSE INJ  2018       Allergies:  Penicillins and Other    Medications:   Home Meds  No current facility-administered medications on file prior to encounter. Current Outpatient Medications on File Prior to Encounter   Medication Sig Dispense Refill    simvastatin (ZOCOR) 40 MG tablet TAKE 1 TABLET BY MOUTH EVERY EVENING 90 tablet 0    Brigatinib (ALUNBRIG) 180 MG TABS Take 180 tablets by mouth      lisinopril (PRINIVIL;ZESTRIL) 10 MG tablet Take 10 mg by mouth daily      XARELTO 20 MG TABS tablet TAKE 1 TABLET BY MOUTH DAILY WITH BREAKFAST 90 tablet 1    Ascorbic Acid (VITAMIN C) 500 MG tablet Take 500 mg by mouth daily      Vitamin D (CHOLECALCIFEROL) 1000 UNITS CAPS capsule Take 1,000 Units by mouth daily.  Multiple Vitamin (MULTIVITAMINS PO) Take 1 tablet by mouth daily.       calcium carbonate (OSCAL) 500 MG TABS tablet Take 500 mg by mouth daily       fish oil-omega-3 fatty acids 1000 MG capsule Take 1 g by mouth daily.          Current Meds    vancomycin (VANCOCIN) 1,000 mg in dextrose 5 % 250 mL IVPB Once         ASA 2 - Patient with mild systemic disease with no functional limitations    II (soft palate, uvula, fauces visible)    Activity:  2 - Able to move 4 extremities voluntarily on command  Respiration:  2 - Able to breathe deeply and cough freely  Circulation:  2 - BP+/- 20mmHg of normal  Consciousness:  2 - Fully awake  Oxygen Saturation (color):  2 - Able to maintain oxygen saturation >92% on room air    Sedation : Moderate sedation planned

## 2019-04-08 NOTE — PROCEDURES
IR Brief Postoperative Note    Cole Crocker  YOB: 1955  [id number]    Pre-operative Diagnosis: central venous obstruction    Post-operative Diagnosis: Same    Procedure: bilat upper extremity venogram with angioplasty    Anesthesia: moderate    Surgeons/Assistants: selma    Estimated Blood Loss: Minimal    Complications: none    Specimens: were not obtained    See full procedure dictation to follow      Miroslava Gregory MD  4/8/2019

## 2019-05-22 ENCOUNTER — HOSPITAL ENCOUNTER (OUTPATIENT)
Dept: VASCULAR LAB | Age: 64
Discharge: HOME | End: 2019-05-22
Payer: COMMERCIAL

## 2019-05-22 PROCEDURE — 93971 EXTREMITY STUDY: CPT

## 2019-05-30 ENCOUNTER — HOSPITAL ENCOUNTER (OUTPATIENT)
Dept: ONCOLOGY | Age: 64
Setting detail: INFUSION SERIES
Discharge: HOME | End: 2019-05-30
Payer: COMMERCIAL

## 2019-05-30 NOTE — PROGRESS NOTES
Joint Aspiration Procedure Note  Indications: left lower extremity redness and swelling +bacteremia    Performed by: Varinder Arambula DPM    Consent obtained? Yes and verbal consent given by patient and her  after being informed of the risks vs benefits of the planned procedure    Time out taken: Yes    Pain Control:   3 ml of 2% lidocaine plain were injected subcutaneously    Using chloroprep the anterior lateral aspect to the left ankle and subtalar joint were prepped. Utilizing sterile technique and an 18 gauge needle with syringe the subtalar joint was accessed. There was no fluid noted with aspiration. The needle was then rotated and repositioned and again no fluid was aspirated. The needle was withdrawn and hemostasis was achieved with gentle compression. A second prep was then performed and utilizing sterile technique and another 18 gauge needle with syringe the ankle joint was accessed. There was no fluid noted with aspiration. The needle was then rotated and repositioned and again no fluid was aspirated. The needle was withdrawn and hemostasis was achieved with gentle compression. Bleeding: None    Hemostasis Achieved: by pressure    Procedural Pain: 5  / 10     Post Procedural Pain: 5 / 10     Response to treatment:  Well tolerated by patient. Discussed with patient and her  that as there was no fluid aspirated I am unable to order fluid analysis of the joint for bacteria or crystals. However as the joints were relatively dry a septic joint or gout is unlikely as both of these conditions usually cause significant joint effusions. As the erythema has improved with IV vancomycin will continue to follow closely. If the erythema does not resolve will consider another tap. Will follow closely. Please call with any changes or worsening symptoms.      Gagan Sam  [phone]

## 2019-06-06 ENCOUNTER — HOSPITAL ENCOUNTER (OUTPATIENT)
Dept: CT IMAGING | Age: 64
Discharge: HOME | End: 2019-06-06
Payer: COMMERCIAL

## 2019-06-06 DIAGNOSIS — C78.7 SECONDARY LIVER CANCER (HCC): ICD-10-CM

## 2019-06-06 DIAGNOSIS — C34.12 PRIMARY CANCER OF LEFT UPPER LOBE OF LUNG (HCC): ICD-10-CM

## 2019-06-06 LAB
GFR AFRICAN AMERICAN: >60
GFR NON-AFRICAN AMERICAN: >60
PERFORMED ON: ABNORMAL
POC CREATININE: 0.5 MG/DL (ref 0.6–1.2)
POC SAMPLE TYPE: ABNORMAL

## 2019-06-06 PROCEDURE — 74177 CT ABD & PELVIS W/CONTRAST: CPT

## 2019-06-06 PROCEDURE — 6360000004 HC RX CONTRAST MEDICATION: Performed by: INTERNAL MEDICINE

## 2019-06-06 PROCEDURE — 82565 ASSAY OF CREATININE: CPT

## 2019-06-06 RX ADMIN — IOPAMIDOL 80 ML: 755 INJECTION, SOLUTION INTRAVENOUS at 11:46

## 2019-06-06 RX ADMIN — IOHEXOL 50 ML: 240 INJECTION, SOLUTION INTRATHECAL; INTRAVASCULAR; INTRAVENOUS; ORAL at 11:46

## 2019-09-12 ENCOUNTER — HOSPITAL ENCOUNTER (OUTPATIENT)
Dept: CT IMAGING | Age: 64
Discharge: HOME | End: 2019-09-12
Payer: COMMERCIAL

## 2019-09-12 DIAGNOSIS — C34.12 PRIMARY CANCER OF LEFT UPPER LOBE OF LUNG (HCC): ICD-10-CM

## 2019-09-12 LAB
GFR AFRICAN AMERICAN: >60
GFR NON-AFRICAN AMERICAN: >60
PERFORMED ON: NORMAL
POC CREATININE: 0.8 MG/DL (ref 0.6–1.2)
POC SAMPLE TYPE: NORMAL

## 2019-09-12 PROCEDURE — 6360000004 HC RX CONTRAST MEDICATION: Performed by: INTERNAL MEDICINE

## 2019-09-12 PROCEDURE — 74177 CT ABD & PELVIS W/CONTRAST: CPT

## 2019-09-12 PROCEDURE — 82565 ASSAY OF CREATININE: CPT

## 2019-09-12 RX ADMIN — IOHEXOL 50 ML: 240 INJECTION, SOLUTION INTRATHECAL; INTRAVASCULAR; INTRAVENOUS; ORAL at 11:19

## 2019-09-12 RX ADMIN — IOPAMIDOL 80 ML: 755 INJECTION, SOLUTION INTRAVENOUS at 11:19

## 2019-10-04 ENCOUNTER — HOSPITAL ENCOUNTER (OUTPATIENT)
Dept: ONCOLOGY | Age: 64
Setting detail: INFUSION SERIES
Discharge: HOME | DRG: 206 | End: 2019-10-04
Payer: COMMERCIAL

## 2019-10-04 ENCOUNTER — HOSPITAL ENCOUNTER (INPATIENT)
Age: 64
LOS: 3 days | Discharge: HOME | DRG: 206 | End: 2019-10-07
Attending: EMERGENCY MEDICINE | Admitting: STUDENT IN AN ORGANIZED HEALTH CARE EDUCATION/TRAINING PROGRAM
Payer: COMMERCIAL

## 2019-10-04 ENCOUNTER — APPOINTMENT (OUTPATIENT)
Dept: GENERAL RADIOLOGY | Age: 64
DRG: 206 | End: 2019-10-04
Payer: COMMERCIAL

## 2019-10-04 ENCOUNTER — HOSPITAL ENCOUNTER (OUTPATIENT)
Dept: CT IMAGING | Age: 64
Discharge: HOME | DRG: 206 | End: 2019-10-04
Payer: COMMERCIAL

## 2019-10-04 DIAGNOSIS — J98.11 COLLAPSE OF LEFT LUNG: Primary | ICD-10-CM

## 2019-10-04 DIAGNOSIS — C34.12 PRIMARY MALIGNANT NEOPLASM OF LEFT UPPER LOBE OF LUNG (HCC): ICD-10-CM

## 2019-10-04 LAB
ANION GAP SERPL CALCULATED.3IONS-SCNC: 14 MMOL/L (ref 3–16)
ANISOCYTOSIS: ABNORMAL
ATYPICAL LYMPHOCYTE RELATIVE PERCENT: 3 % (ref 0–6)
BANDED NEUTROPHILS RELATIVE PERCENT: 4 % (ref 0–7)
BASE EXCESS VENOUS: -1 (ref -3–3)
BASOPHILS ABSOLUTE: 0 K/UL (ref 0–0.2)
BASOPHILS RELATIVE PERCENT: 0 %
BUN BLDV-MCNC: 17 MG/DL (ref 7–20)
CALCIUM SERPL-MCNC: 9.7 MG/DL (ref 8.3–10.6)
CHLORIDE BLD-SCNC: 93 MMOL/L (ref 99–110)
CO2: 23 MMOL/L (ref 21–32)
CREAT SERPL-MCNC: <0.5 MG/DL (ref 0.6–1.2)
EKG ATRIAL RATE: 133 BPM
EKG DIAGNOSIS: NORMAL
EKG P AXIS: 7 DEGREES
EKG P-R INTERVAL: 164 MS
EKG Q-T INTERVAL: 280 MS
EKG QRS DURATION: 54 MS
EKG QTC CALCULATION (BAZETT): 422 MS
EKG R AXIS: -23 DEGREES
EKG T AXIS: 69 DEGREES
EKG VENTRICULAR RATE: 137 BPM
EOSINOPHILS ABSOLUTE: 0 K/UL (ref 0–0.6)
EOSINOPHILS RELATIVE PERCENT: 0 %
GFR AFRICAN AMERICAN: >60
GFR NON-AFRICAN AMERICAN: >60
GLUCOSE BLD-MCNC: 134 MG/DL (ref 70–99)
HCO3 VENOUS: 22.6 MMOL/L (ref 23–29)
HCT VFR BLD CALC: 33.5 % (ref 36–48)
HEMOGLOBIN: 10.9 G/DL (ref 12–16)
INR BLD: 1.37 (ref 0.86–1.14)
LACTATE: 0.89 MMOL/L (ref 0.4–2)
LYMPHOCYTES ABSOLUTE: 0.6 K/UL (ref 1–5.1)
LYMPHOCYTES RELATIVE PERCENT: 6 %
MCH RBC QN AUTO: 29.4 PG (ref 26–34)
MCHC RBC AUTO-ENTMCNC: 32.6 G/DL (ref 31–36)
MCV RBC AUTO: 90 FL (ref 80–100)
METAMYELOCYTES RELATIVE PERCENT: 1 %
MONOCYTES ABSOLUTE: 1 K/UL (ref 0–1.3)
MONOCYTES RELATIVE PERCENT: 15 %
MYELOCYTE PERCENT: 3 %
NEUTROPHILS ABSOLUTE: 4.9 K/UL (ref 1.7–7.7)
NEUTROPHILS RELATIVE PERCENT: 68 %
O2 SAT, VEN: 67 %
PCO2, VEN: 29.3 MM HG (ref 40–50)
PDW BLD-RTO: 16.1 % (ref 12.4–15.4)
PERFORMED ON: ABNORMAL
PH VENOUS: 7.5 (ref 7.35–7.45)
PLATELET # BLD: 322 K/UL (ref 135–450)
PMV BLD AUTO: 8 FL (ref 5–10.5)
PO2, VEN: 31 MM HG
POC SAMPLE TYPE: ABNORMAL
POLYCHROMASIA: ABNORMAL
POTASSIUM REFLEX MAGNESIUM: 3.8 MMOL/L (ref 3.5–5.1)
PRO-BNP: 442 PG/ML (ref 0–124)
PROTHROMBIN TIME: 15.6 SEC (ref 9.8–13)
RBC # BLD: 3.72 M/UL (ref 4–5.2)
SODIUM BLD-SCNC: 130 MMOL/L (ref 136–145)
TCO2 CALC VENOUS: 24 MMOL/L
TROPONIN: <0.01 NG/ML
WBC # BLD: 6.4 K/UL (ref 4–11)

## 2019-10-04 PROCEDURE — 85610 PROTHROMBIN TIME: CPT

## 2019-10-04 PROCEDURE — 99285 EMERGENCY DEPT VISIT HI MDM: CPT

## 2019-10-04 PROCEDURE — 93005 ELECTROCARDIOGRAM TRACING: CPT | Performed by: EMERGENCY MEDICINE

## 2019-10-04 PROCEDURE — 83880 ASSAY OF NATRIURETIC PEPTIDE: CPT

## 2019-10-04 PROCEDURE — 83605 ASSAY OF LACTIC ACID: CPT

## 2019-10-04 PROCEDURE — 6360000004 HC RX CONTRAST MEDICATION: Performed by: NURSE PRACTITIONER

## 2019-10-04 PROCEDURE — 85025 COMPLETE CBC W/AUTO DIFF WBC: CPT

## 2019-10-04 PROCEDURE — 71045 X-RAY EXAM CHEST 1 VIEW: CPT

## 2019-10-04 PROCEDURE — 93005 ELECTROCARDIOGRAM TRACING: CPT | Performed by: INTERNAL MEDICINE

## 2019-10-04 PROCEDURE — 6370000000 HC RX 637 (ALT 250 FOR IP): Performed by: STUDENT IN AN ORGANIZED HEALTH CARE EDUCATION/TRAINING PROGRAM

## 2019-10-04 PROCEDURE — 2000000000 HC ICU R&B

## 2019-10-04 PROCEDURE — 84484 ASSAY OF TROPONIN QUANT: CPT

## 2019-10-04 PROCEDURE — 71275 CT ANGIOGRAPHY CHEST: CPT

## 2019-10-04 PROCEDURE — 36591 DRAW BLOOD OFF VENOUS DEVICE: CPT

## 2019-10-04 PROCEDURE — 87081 CULTURE SCREEN ONLY: CPT

## 2019-10-04 PROCEDURE — 80048 BASIC METABOLIC PNL TOTAL CA: CPT

## 2019-10-04 PROCEDURE — 82803 BLOOD GASES ANY COMBINATION: CPT

## 2019-10-04 PROCEDURE — 2580000003 HC RX 258: Performed by: STUDENT IN AN ORGANIZED HEALTH CARE EDUCATION/TRAINING PROGRAM

## 2019-10-04 RX ORDER — ONDANSETRON 2 MG/ML
4 INJECTION INTRAMUSCULAR; INTRAVENOUS EVERY 6 HOURS PRN
Status: DISCONTINUED | OUTPATIENT
Start: 2019-10-04 | End: 2019-10-07 | Stop reason: HOSPADM

## 2019-10-04 RX ORDER — OMEGA-3/DHA/EPA/FISH OIL 300-1000MG
1 CAPSULE ORAL DAILY
Status: DISCONTINUED | OUTPATIENT
Start: 2019-10-05 | End: 2019-10-04 | Stop reason: RX

## 2019-10-04 RX ORDER — ACETAMINOPHEN 325 MG/1
650 TABLET ORAL EVERY 4 HOURS PRN
Status: DISCONTINUED | OUTPATIENT
Start: 2019-10-04 | End: 2019-10-07 | Stop reason: HOSPADM

## 2019-10-04 RX ORDER — OYSTER SHELL CALCIUM WITH VITAMIN D 500; 200 MG/1; [IU]/1
1 TABLET, FILM COATED ORAL DAILY
COMMUNITY

## 2019-10-04 RX ORDER — SODIUM CHLORIDE 0.9 % (FLUSH) 0.9 %
10 SYRINGE (ML) INJECTION PRN
Status: DISCONTINUED | OUTPATIENT
Start: 2019-10-04 | End: 2019-10-07 | Stop reason: HOSPADM

## 2019-10-04 RX ORDER — ASCORBIC ACID 500 MG
500 TABLET ORAL DAILY
Status: DISCONTINUED | OUTPATIENT
Start: 2019-10-05 | End: 2019-10-07 | Stop reason: HOSPADM

## 2019-10-04 RX ORDER — OYSTER SHELL CALCIUM WITH VITAMIN D 500; 200 MG/1; [IU]/1
1 TABLET, FILM COATED ORAL DAILY
Status: DISCONTINUED | OUTPATIENT
Start: 2019-10-05 | End: 2019-10-07 | Stop reason: HOSPADM

## 2019-10-04 RX ORDER — SIMVASTATIN 40 MG
40 TABLET ORAL NIGHTLY
Status: DISCONTINUED | OUTPATIENT
Start: 2019-10-04 | End: 2019-10-07 | Stop reason: HOSPADM

## 2019-10-04 RX ORDER — SODIUM CHLORIDE 0.9 % (FLUSH) 0.9 %
10 SYRINGE (ML) INJECTION EVERY 12 HOURS SCHEDULED
Status: DISCONTINUED | OUTPATIENT
Start: 2019-10-04 | End: 2019-10-07 | Stop reason: HOSPADM

## 2019-10-04 RX ORDER — SODIUM CHLORIDE 9 MG/ML
INJECTION, SOLUTION INTRAVENOUS CONTINUOUS
Status: DISCONTINUED | OUTPATIENT
Start: 2019-10-04 | End: 2019-10-05

## 2019-10-04 RX ORDER — LISINOPRIL 10 MG/1
10 TABLET ORAL DAILY
Status: DISCONTINUED | OUTPATIENT
Start: 2019-10-05 | End: 2019-10-07 | Stop reason: HOSPADM

## 2019-10-04 RX ORDER — 0.9 % SODIUM CHLORIDE 0.9 %
500 INTRAVENOUS SOLUTION INTRAVENOUS ONCE
Status: COMPLETED | OUTPATIENT
Start: 2019-10-04 | End: 2019-10-04

## 2019-10-04 RX ADMIN — IOPAMIDOL 80 ML: 755 INJECTION, SOLUTION INTRAVENOUS at 15:18

## 2019-10-04 RX ADMIN — SODIUM CHLORIDE: 9 INJECTION, SOLUTION INTRAVENOUS at 22:34

## 2019-10-04 RX ADMIN — SIMVASTATIN 40 MG: 40 TABLET, FILM COATED ORAL at 21:32

## 2019-10-04 RX ADMIN — Medication 10 ML: at 21:32

## 2019-10-04 RX ADMIN — SODIUM CHLORIDE 500 ML: 9 INJECTION, SOLUTION INTRAVENOUS at 21:32

## 2019-10-04 ASSESSMENT — PAIN SCALES - GENERAL: PAINLEVEL_OUTOF10: 0

## 2019-10-05 LAB
ANION GAP SERPL CALCULATED.3IONS-SCNC: 10 MMOL/L (ref 3–16)
ANISOCYTOSIS: ABNORMAL
BASOPHILS ABSOLUTE: 0 K/UL (ref 0–0.2)
BASOPHILS RELATIVE PERCENT: 0 %
BUN BLDV-MCNC: 17 MG/DL (ref 7–20)
CALCIUM SERPL-MCNC: 8.7 MG/DL (ref 8.3–10.6)
CHLORIDE BLD-SCNC: 97 MMOL/L (ref 99–110)
CO2: 26 MMOL/L (ref 21–32)
CREAT SERPL-MCNC: <0.5 MG/DL (ref 0.6–1.2)
EOSINOPHILS ABSOLUTE: 0 K/UL (ref 0–0.6)
EOSINOPHILS RELATIVE PERCENT: 0 %
GFR AFRICAN AMERICAN: >60
GFR NON-AFRICAN AMERICAN: >60
GLUCOSE BLD-MCNC: 161 MG/DL (ref 70–99)
HCT VFR BLD CALC: 29.3 % (ref 36–48)
HEMOGLOBIN: 9.6 G/DL (ref 12–16)
LYMPHOCYTES ABSOLUTE: 0.2 K/UL (ref 1–5.1)
LYMPHOCYTES RELATIVE PERCENT: 3 %
MCH RBC QN AUTO: 29.1 PG (ref 26–34)
MCHC RBC AUTO-ENTMCNC: 32.8 G/DL (ref 31–36)
MCV RBC AUTO: 88.7 FL (ref 80–100)
MONOCYTES ABSOLUTE: 0.1 K/UL (ref 0–1.3)
MONOCYTES RELATIVE PERCENT: 2 %
MYELOCYTE PERCENT: 1 %
NEUTROPHILS ABSOLUTE: 5.8 K/UL (ref 1.7–7.7)
NEUTROPHILS RELATIVE PERCENT: 94 %
PDW BLD-RTO: 16.6 % (ref 12.4–15.4)
PLATELET # BLD: 277 K/UL (ref 135–450)
PMV BLD AUTO: 7.9 FL (ref 5–10.5)
POLYCHROMASIA: ABNORMAL
POTASSIUM REFLEX MAGNESIUM: 3.7 MMOL/L (ref 3.5–5.1)
RBC # BLD: 3.31 M/UL (ref 4–5.2)
SODIUM BLD-SCNC: 133 MMOL/L (ref 136–145)
WBC # BLD: 6.1 K/UL (ref 4–11)

## 2019-10-05 PROCEDURE — 2580000003 HC RX 258: Performed by: STUDENT IN AN ORGANIZED HEALTH CARE EDUCATION/TRAINING PROGRAM

## 2019-10-05 PROCEDURE — 80048 BASIC METABOLIC PNL TOTAL CA: CPT

## 2019-10-05 PROCEDURE — 36591 DRAW BLOOD OFF VENOUS DEVICE: CPT

## 2019-10-05 PROCEDURE — 6360000002 HC RX W HCPCS: Performed by: STUDENT IN AN ORGANIZED HEALTH CARE EDUCATION/TRAINING PROGRAM

## 2019-10-05 PROCEDURE — 6370000000 HC RX 637 (ALT 250 FOR IP): Performed by: STUDENT IN AN ORGANIZED HEALTH CARE EDUCATION/TRAINING PROGRAM

## 2019-10-05 PROCEDURE — 1200000000 HC SEMI PRIVATE

## 2019-10-05 PROCEDURE — 99223 1ST HOSP IP/OBS HIGH 75: CPT | Performed by: INTERNAL MEDICINE

## 2019-10-05 PROCEDURE — 85025 COMPLETE CBC W/AUTO DIFF WBC: CPT

## 2019-10-05 RX ADMIN — SIMVASTATIN 40 MG: 40 TABLET, FILM COATED ORAL at 21:05

## 2019-10-05 RX ADMIN — ENOXAPARIN SODIUM 100 MG: 100 INJECTION SUBCUTANEOUS at 08:14

## 2019-10-05 RX ADMIN — ACETAMINOPHEN 650 MG: 325 TABLET ORAL at 21:05

## 2019-10-05 RX ADMIN — OXYCODONE HYDROCHLORIDE AND ACETAMINOPHEN 500 MG: 500 TABLET ORAL at 07:56

## 2019-10-05 RX ADMIN — LISINOPRIL 10 MG: 10 TABLET ORAL at 07:56

## 2019-10-05 RX ADMIN — SODIUM CHLORIDE: 9 INJECTION, SOLUTION INTRAVENOUS at 07:26

## 2019-10-05 RX ADMIN — OYSTER SHELL CALCIUM WITH VITAMIN D 1 TABLET: 500; 200 TABLET, FILM COATED ORAL at 07:56

## 2019-10-05 RX ADMIN — Medication 10 ML: at 21:06

## 2019-10-05 ASSESSMENT — PAIN SCALES - GENERAL
PAINLEVEL_OUTOF10: 0

## 2019-10-06 LAB
ANION GAP SERPL CALCULATED.3IONS-SCNC: 12 MMOL/L (ref 3–16)
BASOPHILS ABSOLUTE: 0 K/UL (ref 0–0.2)
BASOPHILS RELATIVE PERCENT: 0.4 %
BUN BLDV-MCNC: 12 MG/DL (ref 7–20)
CALCIUM SERPL-MCNC: 9 MG/DL (ref 8.3–10.6)
CHLORIDE BLD-SCNC: 96 MMOL/L (ref 99–110)
CO2: 26 MMOL/L (ref 21–32)
CREAT SERPL-MCNC: <0.5 MG/DL (ref 0.6–1.2)
EKG ATRIAL RATE: 127 BPM
EKG DIAGNOSIS: NORMAL
EKG P AXIS: 82 DEGREES
EKG P-R INTERVAL: 154 MS
EKG Q-T INTERVAL: 262 MS
EKG QRS DURATION: 52 MS
EKG QTC CALCULATION (BAZETT): 380 MS
EKG R AXIS: 65 DEGREES
EKG T AXIS: 96 DEGREES
EKG VENTRICULAR RATE: 127 BPM
EOSINOPHILS ABSOLUTE: 0 K/UL (ref 0–0.6)
EOSINOPHILS RELATIVE PERCENT: 0.5 %
GFR AFRICAN AMERICAN: >60
GFR NON-AFRICAN AMERICAN: >60
GLUCOSE BLD-MCNC: 143 MG/DL (ref 70–99)
HCT VFR BLD CALC: 29.5 % (ref 36–48)
HEMOGLOBIN: 9.8 G/DL (ref 12–16)
LYMPHOCYTES ABSOLUTE: 0.5 K/UL (ref 1–5.1)
LYMPHOCYTES RELATIVE PERCENT: 10.6 %
MCH RBC QN AUTO: 29.6 PG (ref 26–34)
MCHC RBC AUTO-ENTMCNC: 33.2 G/DL (ref 31–36)
MCV RBC AUTO: 89.3 FL (ref 80–100)
MONOCYTES ABSOLUTE: 0.4 K/UL (ref 0–1.3)
MONOCYTES RELATIVE PERCENT: 8.4 %
NEUTROPHILS ABSOLUTE: 3.4 K/UL (ref 1.7–7.7)
NEUTROPHILS RELATIVE PERCENT: 80.1 %
PDW BLD-RTO: 16.8 % (ref 12.4–15.4)
PLATELET # BLD: 209 K/UL (ref 135–450)
PMV BLD AUTO: 7.8 FL (ref 5–10.5)
POTASSIUM REFLEX MAGNESIUM: 4 MMOL/L (ref 3.5–5.1)
RBC # BLD: 3.31 M/UL (ref 4–5.2)
SODIUM BLD-SCNC: 134 MMOL/L (ref 136–145)
VRE CULTURE: NORMAL
WBC # BLD: 4.3 K/UL (ref 4–11)

## 2019-10-06 PROCEDURE — 6360000002 HC RX W HCPCS: Performed by: INTERNAL MEDICINE

## 2019-10-06 PROCEDURE — 2580000003 HC RX 258: Performed by: STUDENT IN AN ORGANIZED HEALTH CARE EDUCATION/TRAINING PROGRAM

## 2019-10-06 PROCEDURE — 80048 BASIC METABOLIC PNL TOTAL CA: CPT

## 2019-10-06 PROCEDURE — 2700000000 HC OXYGEN THERAPY PER DAY

## 2019-10-06 PROCEDURE — G0008 ADMIN INFLUENZA VIRUS VAC: HCPCS | Performed by: INTERNAL MEDICINE

## 2019-10-06 PROCEDURE — 6370000000 HC RX 637 (ALT 250 FOR IP): Performed by: STUDENT IN AN ORGANIZED HEALTH CARE EDUCATION/TRAINING PROGRAM

## 2019-10-06 PROCEDURE — 90686 IIV4 VACC NO PRSV 0.5 ML IM: CPT | Performed by: INTERNAL MEDICINE

## 2019-10-06 PROCEDURE — 99232 SBSQ HOSP IP/OBS MODERATE 35: CPT | Performed by: INTERNAL MEDICINE

## 2019-10-06 PROCEDURE — 1200000000 HC SEMI PRIVATE

## 2019-10-06 PROCEDURE — 85025 COMPLETE CBC W/AUTO DIFF WBC: CPT

## 2019-10-06 PROCEDURE — 93010 ELECTROCARDIOGRAM REPORT: CPT | Performed by: INTERNAL MEDICINE

## 2019-10-06 PROCEDURE — 6360000002 HC RX W HCPCS: Performed by: STUDENT IN AN ORGANIZED HEALTH CARE EDUCATION/TRAINING PROGRAM

## 2019-10-06 RX ADMIN — OXYCODONE HYDROCHLORIDE AND ACETAMINOPHEN 500 MG: 500 TABLET ORAL at 09:36

## 2019-10-06 RX ADMIN — INFLUENZA A VIRUS A/BRISBANE/02/2018 IVR-190 (H1N1) ANTIGEN (PROPIOLACTONE INACTIVATED), INFLUENZA A VIRUS A/KANSAS/14/2017 X-327 (H3N2) ANTIGEN (PROPIOLACTONE INACTIVATED), INFLUENZA B VIRUS B/MARYLAND/15/2016 ANTIGEN (PROPIOLACTONE INACTIVATED), INFLUENZA B VIRUS B/PHUKET/3073/2013 BVR-1B ANTIGEN (PROPIOLACTONE INACTIVATED) 0.5 ML: 15; 15; 15; 15 INJECTION, SUSPENSION INTRAMUSCULAR at 09:36

## 2019-10-06 RX ADMIN — ENOXAPARIN SODIUM 100 MG: 100 INJECTION SUBCUTANEOUS at 09:36

## 2019-10-06 RX ADMIN — OYSTER SHELL CALCIUM WITH VITAMIN D 1 TABLET: 500; 200 TABLET, FILM COATED ORAL at 09:36

## 2019-10-06 RX ADMIN — SIMVASTATIN 40 MG: 40 TABLET, FILM COATED ORAL at 21:00

## 2019-10-06 RX ADMIN — Medication 10 ML: at 21:00

## 2019-10-06 RX ADMIN — Medication 10 ML: at 09:37

## 2019-10-06 RX ADMIN — LISINOPRIL 10 MG: 10 TABLET ORAL at 09:36

## 2019-10-06 ASSESSMENT — PAIN SCALES - GENERAL
PAINLEVEL_OUTOF10: 0

## 2019-10-07 VITALS
OXYGEN SATURATION: 96 % | HEIGHT: 62 IN | HEART RATE: 117 BPM | SYSTOLIC BLOOD PRESSURE: 133 MMHG | TEMPERATURE: 100.2 F | BODY MASS INDEX: 23.76 KG/M2 | DIASTOLIC BLOOD PRESSURE: 82 MMHG | RESPIRATION RATE: 18 BRPM | WEIGHT: 129.1 LBS

## 2019-10-07 LAB
ANION GAP SERPL CALCULATED.3IONS-SCNC: 13 MMOL/L (ref 3–16)
BASOPHILS ABSOLUTE: 0 K/UL (ref 0–0.2)
BASOPHILS RELATIVE PERCENT: 0.2 %
BUN BLDV-MCNC: 11 MG/DL (ref 7–20)
CALCIUM SERPL-MCNC: 8.9 MG/DL (ref 8.3–10.6)
CHLORIDE BLD-SCNC: 92 MMOL/L (ref 99–110)
CO2: 27 MMOL/L (ref 21–32)
CREAT SERPL-MCNC: <0.5 MG/DL (ref 0.6–1.2)
EOSINOPHILS ABSOLUTE: 0 K/UL (ref 0–0.6)
EOSINOPHILS RELATIVE PERCENT: 0.8 %
GFR AFRICAN AMERICAN: >60
GFR NON-AFRICAN AMERICAN: >60
GLUCOSE BLD-MCNC: 124 MG/DL (ref 70–99)
HCT VFR BLD CALC: 28.8 % (ref 36–48)
HEMOGLOBIN: 9.5 G/DL (ref 12–16)
LYMPHOCYTES ABSOLUTE: 0.5 K/UL (ref 1–5.1)
LYMPHOCYTES RELATIVE PERCENT: 19.1 %
MCH RBC QN AUTO: 29.7 PG (ref 26–34)
MCHC RBC AUTO-ENTMCNC: 33 G/DL (ref 31–36)
MCV RBC AUTO: 89.9 FL (ref 80–100)
MONOCYTES ABSOLUTE: 0.2 K/UL (ref 0–1.3)
MONOCYTES RELATIVE PERCENT: 8.3 %
NEUTROPHILS ABSOLUTE: 2 K/UL (ref 1.7–7.7)
NEUTROPHILS RELATIVE PERCENT: 71.6 %
PDW BLD-RTO: 16 % (ref 12.4–15.4)
PLATELET # BLD: 207 K/UL (ref 135–450)
PMV BLD AUTO: 8.1 FL (ref 5–10.5)
POTASSIUM REFLEX MAGNESIUM: 4.2 MMOL/L (ref 3.5–5.1)
RBC # BLD: 3.2 M/UL (ref 4–5.2)
SODIUM BLD-SCNC: 132 MMOL/L (ref 136–145)
WBC # BLD: 2.7 K/UL (ref 4–11)

## 2019-10-07 PROCEDURE — 99221 1ST HOSP IP/OBS SF/LOW 40: CPT | Performed by: NURSE PRACTITIONER

## 2019-10-07 PROCEDURE — 85025 COMPLETE CBC W/AUTO DIFF WBC: CPT

## 2019-10-07 PROCEDURE — 77334 RADIATION TREATMENT AID(S): CPT

## 2019-10-07 PROCEDURE — 80048 BASIC METABOLIC PNL TOTAL CA: CPT

## 2019-10-07 PROCEDURE — 6360000002 HC RX W HCPCS: Performed by: STUDENT IN AN ORGANIZED HEALTH CARE EDUCATION/TRAINING PROGRAM

## 2019-10-07 PROCEDURE — 6370000000 HC RX 637 (ALT 250 FOR IP): Performed by: STUDENT IN AN ORGANIZED HEALTH CARE EDUCATION/TRAINING PROGRAM

## 2019-10-07 PROCEDURE — 99233 SBSQ HOSP IP/OBS HIGH 50: CPT | Performed by: INTERNAL MEDICINE

## 2019-10-07 PROCEDURE — 77290 THER RAD SIMULAJ FIELD CPLX: CPT

## 2019-10-07 PROCEDURE — 94680 O2 UPTK RST&XERS DIR SIMPLE: CPT

## 2019-10-07 RX ADMIN — OXYCODONE HYDROCHLORIDE AND ACETAMINOPHEN 500 MG: 500 TABLET ORAL at 10:40

## 2019-10-07 RX ADMIN — ACETAMINOPHEN 650 MG: 325 TABLET ORAL at 15:10

## 2019-10-07 RX ADMIN — LISINOPRIL 10 MG: 10 TABLET ORAL at 10:40

## 2019-10-07 RX ADMIN — ENOXAPARIN SODIUM 100 MG: 100 INJECTION SUBCUTANEOUS at 10:41

## 2019-10-07 RX ADMIN — OYSTER SHELL CALCIUM WITH VITAMIN D 1 TABLET: 500; 200 TABLET, FILM COATED ORAL at 10:40

## 2019-10-07 ASSESSMENT — PAIN SCALES - GENERAL
PAINLEVEL_OUTOF10: 0

## 2019-10-09 PROBLEM — J98.11 COLLAPSE OF LEFT LUNG: Chronic | Status: ACTIVE | Noted: 2019-10-04
